# Patient Record
Sex: MALE | Race: OTHER | Employment: OTHER | URBAN - METROPOLITAN AREA
[De-identification: names, ages, dates, MRNs, and addresses within clinical notes are randomized per-mention and may not be internally consistent; named-entity substitution may affect disease eponyms.]

---

## 2020-05-05 ENCOUNTER — HOSPITAL ENCOUNTER (EMERGENCY)
Age: 60
Discharge: HOME | End: 2020-05-05
Attending: EMERGENCY MEDICINE
Payer: SELF-PAY

## 2020-05-05 ENCOUNTER — APPOINTMENT (OUTPATIENT)
Dept: CT IMAGING | Age: 60
End: 2020-05-05
Attending: EMERGENCY MEDICINE
Payer: SELF-PAY

## 2020-05-05 VITALS
WEIGHT: 185 LBS | BODY MASS INDEX: 27.4 KG/M2 | SYSTOLIC BLOOD PRESSURE: 163 MMHG | HEIGHT: 69 IN | OXYGEN SATURATION: 98 % | TEMPERATURE: 98.3 F | HEART RATE: 85 BPM | RESPIRATION RATE: 18 BRPM | DIASTOLIC BLOOD PRESSURE: 89 MMHG

## 2020-05-05 DIAGNOSIS — J01.30 ACUTE NON-RECURRENT SPHENOIDAL SINUSITIS: ICD-10-CM

## 2020-05-05 DIAGNOSIS — R04.0 EPISTAXIS: Primary | ICD-10-CM

## 2020-05-05 LAB
ALBUMIN SERPL-MCNC: 4.2 G/DL (ref 3.5–5)
ALBUMIN/GLOB SERPL: 1.1 {RATIO} (ref 1.2–3.5)
ALP SERPL-CCNC: 49 U/L (ref 50–136)
ALT SERPL-CCNC: 37 U/L (ref 12–65)
ANION GAP SERPL CALC-SCNC: 9 MMOL/L (ref 7–16)
AST SERPL-CCNC: 22 U/L (ref 15–37)
BASOPHILS # BLD: 0.1 K/UL (ref 0–0.2)
BASOPHILS NFR BLD: 1 % (ref 0–2)
BILIRUB SERPL-MCNC: 1.1 MG/DL (ref 0.2–1.1)
BUN SERPL-MCNC: 21 MG/DL (ref 6–23)
CALCIUM SERPL-MCNC: 9.1 MG/DL (ref 8.3–10.4)
CHLORIDE SERPL-SCNC: 104 MMOL/L (ref 98–107)
CO2 SERPL-SCNC: 25 MMOL/L (ref 21–32)
CREAT SERPL-MCNC: 0.89 MG/DL (ref 0.8–1.5)
DIFFERENTIAL METHOD BLD: NORMAL
EOSINOPHIL # BLD: 0.3 K/UL (ref 0–0.8)
EOSINOPHIL NFR BLD: 3 % (ref 0.5–7.8)
ERYTHROCYTE [DISTWIDTH] IN BLOOD BY AUTOMATED COUNT: 13.1 % (ref 11.9–14.6)
GLOBULIN SER CALC-MCNC: 4 G/DL (ref 2.3–3.5)
GLUCOSE SERPL-MCNC: 85 MG/DL (ref 65–100)
HCT VFR BLD AUTO: 43.1 % (ref 41.1–50.3)
HGB BLD-MCNC: 14.6 G/DL (ref 13.6–17.2)
IMM GRANULOCYTES # BLD AUTO: 0.1 K/UL (ref 0–0.5)
IMM GRANULOCYTES NFR BLD AUTO: 1 % (ref 0–5)
LYMPHOCYTES # BLD: 2.3 K/UL (ref 0.5–4.6)
LYMPHOCYTES NFR BLD: 23 % (ref 13–44)
MCH RBC QN AUTO: 28.2 PG (ref 26.1–32.9)
MCHC RBC AUTO-ENTMCNC: 33.9 G/DL (ref 31.4–35)
MCV RBC AUTO: 83.4 FL (ref 79.6–97.8)
MONOCYTES # BLD: 0.8 K/UL (ref 0.1–1.3)
MONOCYTES NFR BLD: 8 % (ref 4–12)
NEUTS SEG # BLD: 6.6 K/UL (ref 1.7–8.2)
NEUTS SEG NFR BLD: 65 % (ref 43–78)
NRBC # BLD: 0 K/UL (ref 0–0.2)
PLATELET # BLD AUTO: 267 K/UL (ref 150–450)
PMV BLD AUTO: 9.6 FL (ref 9.4–12.3)
POTASSIUM SERPL-SCNC: 3.7 MMOL/L (ref 3.5–5.1)
PROT SERPL-MCNC: 8.2 G/DL (ref 6.3–8.2)
RBC # BLD AUTO: 5.17 M/UL (ref 4.23–5.6)
SODIUM SERPL-SCNC: 138 MMOL/L (ref 136–145)
WBC # BLD AUTO: 10.1 K/UL (ref 4.3–11.1)

## 2020-05-05 PROCEDURE — 80053 COMPREHEN METABOLIC PANEL: CPT

## 2020-05-05 PROCEDURE — 99283 EMERGENCY DEPT VISIT LOW MDM: CPT

## 2020-05-05 PROCEDURE — 85025 COMPLETE CBC W/AUTO DIFF WBC: CPT

## 2020-05-05 PROCEDURE — 70450 CT HEAD/BRAIN W/O DYE: CPT

## 2020-05-05 RX ORDER — AMOXICILLIN AND CLAVULANATE POTASSIUM 875; 125 MG/1; MG/1
1 TABLET, FILM COATED ORAL 2 TIMES DAILY
Qty: 14 TAB | Refills: 0 | Status: SHIPPED | OUTPATIENT
Start: 2020-05-05 | End: 2020-05-12

## 2020-05-05 NOTE — PROGRESS NOTES
present over the phone for test results with Dr. Ariela Cabrera.         [address] [facility]  Language Services Department  [facility] [address]  [address] [phone] (phone)

## 2020-05-05 NOTE — ED PROVIDER NOTES
29-year-old gentleman presents with concerns about pain in his face and head that is gotten worse for the last 2 or 3 days. He says yesterday he it became more intense. He has had no fevers or chills and no nausea, vomiting, or diarrhea. He did have a nosebleed earlier today that he said he had a lot of bright blood. He was able to stop the nosebleed on his own after about 10 minutes. No prior history of any severe headaches or severe bleeding problems. No other associated symptoms. Elements of this note were created using speech recognition software. As such, errors of speech recognition may be present. No past medical history on file. No past surgical history on file. No family history on file.     Social History     Socioeconomic History    Marital status:      Spouse name: Not on file    Number of children: Not on file    Years of education: Not on file    Highest education level: Not on file   Occupational History    Not on file   Social Needs    Financial resource strain: Not on file    Food insecurity     Worry: Not on file     Inability: Not on file    Transportation needs     Medical: Not on file     Non-medical: Not on file   Tobacco Use    Smoking status: Not on file   Substance and Sexual Activity    Alcohol use: Not on file    Drug use: Not on file    Sexual activity: Not on file   Lifestyle    Physical activity     Days per week: Not on file     Minutes per session: Not on file    Stress: Not on file   Relationships    Social connections     Talks on phone: Not on file     Gets together: Not on file     Attends [employer] service: Not on file     Active member of club or organization: Not on file     Attends meetings of clubs or organizations: Not on file     Relationship status: Not on file    Intimate partner violence     Fear of current or ex partner: Not on file     Emotionally abused: Not on file     Physically abused: Not on file     Forced sexual activity: Not on file   Other Topics Concern    Not on file   Social History Narrative    Not on file         ALLERGIES: Patient has no known allergies. Review of Systems   Constitutional: Negative for chills, diaphoresis and fever. HENT: Positive for nosebleeds. Negative for congestion, rhinorrhea and sore throat. Eyes: Negative for redness and visual disturbance. Respiratory: Negative for cough, chest tightness, shortness of breath and wheezing. Cardiovascular: Negative for chest pain and palpitations. Gastrointestinal: Negative for abdominal pain, blood in stool, diarrhea, nausea and vomiting. Endocrine: Negative for polydipsia and polyuria. Genitourinary: Negative for dysuria and hematuria. Musculoskeletal: Negative for arthralgias, myalgias and neck stiffness. Skin: Negative for rash. Allergic/Immunologic: Negative for environmental allergies and food allergies. Neurological: Positive for headaches. Negative for dizziness and weakness. Hematological: Negative for adenopathy. Does not bruise/bleed easily. Psychiatric/Behavioral: Negative for confusion and sleep disturbance. The patient is not nervous/anxious. Vitals:    05/05/20 1441   BP: 158/88   Pulse: 84   Resp: 18   Temp: 98.3 °F (36.8 °C)   SpO2: 95%   Weight: 83.9 kg (185 lb)   Height: 5' 8.9\" (1.75 m)            Physical Exam  Vitals signs and nursing note reviewed. Constitutional:       General: He is not in acute distress. Appearance: He is well-developed. He is not toxic-appearing. HENT:      Head: Normocephalic and atraumatic. Eyes:      General: No scleral icterus. Right eye: No discharge. Left eye: No discharge. Conjunctiva/sclera: Conjunctivae normal.      Pupils: Pupils are equal, round, and reactive to light. Neck:      Musculoskeletal: Normal range of motion. No neck rigidity. Cardiovascular:      Rate and Rhythm: Normal rate and regular rhythm.       Heart sounds: Normal heart sounds. Pulmonary:      Effort: Pulmonary effort is normal. No respiratory distress. Breath sounds: Normal breath sounds. No wheezing or rales. Chest:      Chest wall: No tenderness. Abdominal:      General: Bowel sounds are normal. There is no distension. Palpations: Abdomen is soft. Tenderness: There is no guarding or rebound. Musculoskeletal: Normal range of motion. General: No tenderness. Lymphadenopathy:      Cervical: No cervical adenopathy. Skin:     General: Skin is warm and dry. Neurological:      General: No focal deficit present. Mental Status: He is alert and oriented to person, place, and time. Psychiatric:         Mood and Affect: Mood normal.         Behavior: Behavior normal.          MDM  Number of Diagnoses or Management Options  Acute non-recurrent sphenoidal sinusitis:   Epistaxis:   Diagnosis management comments: CT showed debris and sinusitis. I suspect the debris or clots. I will discharge him home with some antibiotics.          Procedures

## 2020-05-05 NOTE — ED NOTES
I have reviewed discharge instructions with the patient. The patient verbalized understanding. Patient left ED via Discharge Method: ambulatory to Home with self    Opportunity for questions and clarification provided. Patient given 1 scripts. No esign        To continue your aftercare when you leave the hospital, you may receive an automated call from our care team to check in on how you are doing. This is a free service and part of our promise to provide the best care and service to meet your aftercare needs.  If you have questions, or wish to unsubscribe from this service please call [phone]. Thank you for Choosing our [facility] Emergency Department.

## 2020-05-05 NOTE — PROGRESS NOTES
present over the phone for discharge instructions.       [address] [facility] Services Department  [address]  [address]  [phone] (phone)

## 2020-05-05 NOTE — DISCHARGE INSTRUCTIONS
Return with any fevers, vomiting, difficulty breathing, worsening symptoms, or additional concerns. Follow-up with a primary care doctor for reevaluation as needed. Patient Education        Hemorragias nasales: Instrucciones de cuidado  Nosebleeds: Care Instructions  Instrucciones de [address] [place] [address] hemorragias (sangrados) nasales son comunes, sobre todo si usted tiene resfriados o alergias. Hay muchas cosas que pueden causar melody hemorragia nasal.  Algunas hemorragias nasales se detienen por sí solas con presión. Otras requieren taponamiento. Algunas se cauterizan (sellan). Si tiene gasa u otro material para taponar la nariz, deberá hacer melody visita de seguimiento con dickson médico para le sea retirado el tapón. Puede que requiera tratamiento adicional si tiene hemorragias nasales con frecuencia. El médico lo abdalla examinado minuciosamente, gege más tarde pueden presentarse problemas. Si nota algún problema o síntoma nuevo, busque tratamiento médico de inmediato. La atención de seguimiento es melody parte clave de dickson tratamiento y seguridad. Asegúrese de hacer y acudir a todas las citas, y llame a dickson médico si está teniendo problemas. También es melody buena idea saber los resultados de brunilda exámenes y mantener melody lista de los medicamentos que lakhwinder. ¿Cómo puede cuidarse en el hogar? · Si usted tiene otra hemorragia nasal:  ? Siéntese e incline la ginger un poco hacia adelante. [place]l impide que la zach vaya hacia la garganta. ? Use los dedos pulgar e índice para apretarse la nariz y cerrarla por 10 minutos. Use un reloj. No verifique si se ha detenido la hemorragia antes de que transcurran 10 minutos. Si no se detiene la hemorragia, apriétese la nariz por 10 minutos más. ? Cuando se haya detenido la hemorragia, trate de no hurgarse, frotarse ni sonarse la nariz por 12 horas. Evitar estas cosas ayuda a impedir que [employer] nariz de [place]. · Si dickson médico le recetó antibióticos, tómelos según las indicaciones.  No deje de tomarlos solo porque se sienta mejor. Debe linnea todos los antibióticos hasta terminarlos. [address] hemorragias nasales  · No se suene la nariz con mucha fuerza. · Evite levantar cosas o esforzarse después de melody hemorragia nasal.  · Eleve la ginger con melody almohada mientras duerme. · Póngase melody capa delgada de gel nasal a base de [facility] o de [address] [place] [address], dom [place], dentro de la [place]. Póngaselo en el tabique, el cual divide las fosas nasales. [place] prevendrá la sequedad que puede causar hemorragias nasales. · Use un vaporizador o humidificador para añadirle humedad a dickson dormitorio. Siga las instrucciones para limpiar el aparato. · No tome aspirina, ibuprofeno (Advil, Motrin) o naproxeno (Aleve) por un período de 36 a 48 horas después de melody hemorragia nasal, a menos que dickson médico se lo indique. Puede usar acetaminofén (Tylenol) para aliviar el dolor. · Hable con dickson médico acerca de suspender cualquier otro medicamento que esté tomando. Algunos medicamentos podrían aumentar las probabilidades de que tenga melody hemorragia nasal.  · No utilice medicamentos para el resfriado ni aerosoles nasales sin hablar diallo con dickson médico. Pueden secarle la nariz. ¿Cuándo debes pedir ayuda? Llama al 911 toda vez que pienses que puedes necesitar atención de [place]. Por ejemplo, llama si:    · Te desmayaste (perdiste el conocimiento).    Llama a tu médico ahora mismo o busca atención médica inmediata si:    · Tienes otra hemorragia nasal y te sigue sangrando la nariz después de haberte hecho presión 3 veces por 10 minutos (30 minutos en total).     · Hay mucha zach que te baja por detrás de la garganta aunque te hayas presionado la nariz e inclinado la ginger hacia adelante.     · Tienes fiebre.     · Tienes dolor en los senos paranasales.    Presta especial atención a los cambios en tu peggy y asegúrate de comunicarte con tu médico si:    · Tienes hemorragias nasales a menudo, incluso si se detienen.   · No mejoras dom se esperaba. ¿Dónde puede encontrar más información en inglés? Vaya a http://juan-zeyad.info/  [place] [address] en la búsqueda para aprender más acerca de \"Hemorragias nasales: Instrucciones de cuidado. \"  Revisado: 26 junio, 2019Versión del contenido: 12.4  © 6004-8605 Healthwise, Incorporated. Las instrucciones de cuidado fueron adaptadas bajo licencia por [employer] (which disclaims liability or warranty for this information). Si usted tiene [place] afección médica o sobre estas instrucciones, siempre pregunte a dickson profesional de peggy. Healthwise, Incorporated niega toda garantía o responsabilidad por dickson uso de esta información. Patient Education        Lavados nasales salinos: Instrucciones de cuidado  Saline Nasal Washes: Care Instructions  Instrucciones de cuidado  Los lavados nasales salinos ayudan a mantener las fosas nasales abiertas al eliminar la mucosidad espesa o seca. Jeana sencillo remedio puede ayudar a [employer] síntomas de [place], sinusitis y resfriados. También puede hacer que la nariz se sienta más cómoda al [employer] las membranas mucosas húmedas. Es posible que note melody ligera sensación de ardor en la nariz las primeras veces que use la solución, gege esto por lo general mejora en unos cuantos días. La atención de seguimiento es melody parte clave de dickson tratamiento y seguridad. Asegúrese de hacer y acudir a todas las citas, y llame a dickson médico si está teniendo problemas. También es melody buena idea saber los resultados de brunilda exámenes y mantener melody lista de los medicamentos que lakhwinder. ¿Cómo puede cuidarse en el hogar? · Puede comprar en la farmacia melody solución salina lista para usar en melody botella de apretar u otros productos de lavado nasal salino. Lesli y siga las instrucciones de la etiqueta.   · También puede preparar dickson propia solución salina agregándole 1 cucharadita de sal y 1 cucharadita de bicarbonato de sodio a 2 tazas de [employer]. · Si [place] melody solución hecha en casa, eche un poco en un tazón limpio. Utilizando melody john de goma, comprima la john e introduzca la boquilla en el agua salada. Recoja melody pequeña cantidad de agua salada en la john aflojando la mano. · Siéntese con la ginger inclinada un poco hacia atrás. No se recueste del todo. Introduzca un poco la boquilla de la john de goma o de la botella de apretar en melody de las fosas nasales. Eche suavemente unas cuantas gotas o un pequeño chorro en melody de las fosas nasales. Repita la operación en la otra fosa nasal. Es normal tener unos cuantos estornudos y arcadas al principio. · Suénese la nariz con cuidado. · Limpie la john de goma o la boquilla de la botella después de [address]. · [place] esto 2 o 3 veces al día. · Hágase el lavado nasal con cuidado si le sangra la nariz con frecuencia. ¿Cuándo debe pedir ayuda? Preste especial atención a los cambios en dickson peggy y asegúrese de comunicarse con dickson médico si:    · Tiene hemorragias nasales frecuentes.     · Tiene problemas para hacerse los lavados nasales. ¿Dónde puede encontrar más información en inglés? Vaya a http://juan-zeyad.info/  Keisha Bee [id number] en la búsqueda para aprender más acerca de \"Lavados nasales salinos: Instrucciones de cuidado. \"  Revisado: 28 julio, 2019Versión del contenido: 12.4  © 8796-2724 Healthwise, Incorporated. Las instrucciones de cuidado fueron adaptadas bajo licencia por [employer] (which disclaims liability or warranty for this information). Si usted tiene [place] afección médica o sobre estas instrucciones, siempre pregunte a dickson profesional de peggy. [facility] niega toda garantía o responsabilidad por dickson uso de esta información.

## 2020-05-05 NOTE — ED TRIAGE NOTES
Pt ambulatory to triage wearing a mask. Pt is Turkmen-speaking. DT  called twice with no answer.  line used. Pt c/o headache and nose bleed. Pt reports he woke up at 0300 this morning with \"a really severe headache and a nose bleed. I checked my BP, but it was 143/58. I have never had anything happen like this before. \" Pt denies Hx of HTN, heart problems, or diabetes. Pt reports no PmHx, no Rx, and NKA. Pt denies any n/v or weakness with HA and nose bleed. Pt does reports, \"There was a lot of bright red blood. \" Pt denies any other pain, symptoms or complaints. Pt reports a \"moderate HA\" right now and rates the pain a 6/10.